# Patient Record
Sex: MALE | Race: ASIAN | NOT HISPANIC OR LATINO
[De-identification: names, ages, dates, MRNs, and addresses within clinical notes are randomized per-mention and may not be internally consistent; named-entity substitution may affect disease eponyms.]

---

## 2022-05-18 PROBLEM — Z00.00 ENCOUNTER FOR PREVENTIVE HEALTH EXAMINATION: Status: ACTIVE | Noted: 2022-05-18

## 2022-05-20 ENCOUNTER — APPOINTMENT (OUTPATIENT)
Dept: UROLOGY | Facility: CLINIC | Age: 37
End: 2022-05-20
Payer: MEDICAID

## 2022-05-20 VITALS
SYSTOLIC BLOOD PRESSURE: 132 MMHG | BODY MASS INDEX: 26.37 KG/M2 | DIASTOLIC BLOOD PRESSURE: 94 MMHG | WEIGHT: 168 LBS | TEMPERATURE: 97.6 F | HEART RATE: 73 BPM | HEIGHT: 67 IN

## 2022-05-20 DIAGNOSIS — Z78.9 OTHER SPECIFIED HEALTH STATUS: ICD-10-CM

## 2022-05-20 DIAGNOSIS — Z80.9 FAMILY HISTORY OF MALIGNANT NEOPLASM, UNSPECIFIED: ICD-10-CM

## 2022-05-20 DIAGNOSIS — Z86.79 PERSONAL HISTORY OF OTHER DISEASES OF THE CIRCULATORY SYSTEM: ICD-10-CM

## 2022-05-20 DIAGNOSIS — Z87.442 PERSONAL HISTORY OF URINARY CALCULI: ICD-10-CM

## 2022-05-20 DIAGNOSIS — N46.9 MALE INFERTILITY, UNSPECIFIED: ICD-10-CM

## 2022-05-20 DIAGNOSIS — Z86.39 PERSONAL HISTORY OF OTHER ENDOCRINE, NUTRITIONAL AND METABOLIC DISEASE: ICD-10-CM

## 2022-05-20 PROCEDURE — 99204 OFFICE O/P NEW MOD 45 MIN: CPT

## 2022-05-20 NOTE — ASSESSMENT
[FreeTextEntry1] : Mr. Castellon is a very pleasant 36 year old man here today for evaluation for infertility.\par TSH.\par FSH.\par LH.\par Testosterone.\par Prolactin.\par SHBG.\par Complete semen analysis.\par We discussed potential etiologies of subfertility\par RTO in 1 month scrotal US.

## 2022-05-20 NOTE — PHYSICAL EXAM
[General Appearance - Well Developed] : well developed [General Appearance - Well Nourished] : well nourished [Normal Appearance] : normal appearance [Well Groomed] : well groomed [General Appearance - In No Acute Distress] : no acute distress [Edema] : no peripheral edema [Respiration, Rhythm And Depth] : normal respiratory rhythm and effort [Exaggerated Use Of Accessory Muscles For Inspiration] : no accessory muscle use [Abdomen Soft] : soft [Abdomen Tenderness] : non-tender [Costovertebral Angle Tenderness] : no ~M costovertebral angle tenderness [Normal Station and Gait] : the gait and station were normal for the patient's age [] : no rash [No Focal Deficits] : no focal deficits [Oriented To Time, Place, And Person] : oriented to person, place, and time [Affect] : the affect was normal [Mood] : the mood was normal [Not Anxious] : not anxious [No Palpable Adenopathy] : no palpable adenopathy [Scrotum] : the scrotum was normal [Rectal Exam - Seminal Vesicles] : the seminal vesicles were normal [Epididymis] : the epididymides were normal [Testes Tenderness] : no tenderness of the testes [Testes Mass (___cm)] : there were no testicular masses

## 2022-05-20 NOTE — HISTORY OF PRESENT ILLNESS
[Currently Experiencing ___] :  [unfilled] [None] : None [FreeTextEntry1] : Mr. Castellon is a very pleasant 36 year old man here today for infertility.\par He reports that for the past year he and his wife have been actively trying to conceive with no success.\par His wife, Tracy, is 32.\par They deny using any form of contraception before.\par He denies any past pregnancies with any other women.\par She reports she has 2 children from other men, who are 7 and 11 years old.\par She reports that she is currently undergoing a work up with her GYN for fertility.\par He denies any urological symptoms.\par Denies any fatigue.\par Former smoker, quit 8 years ago.\par No history of trauma or surgeries to the scrotum.

## 2022-05-21 LAB
FSH SERPL-MCNC: 3.1 IU/L
LH SERPL-ACNC: 3.7 IU/L
PROLACTIN SERPL-MCNC: 4.7 NG/ML
TESTOST SERPL-MCNC: 237 NG/DL
TSH SERPL-ACNC: 2.03 UIU/ML

## 2022-05-26 LAB — SHBG SERPL-SCNC: 14.1 NMOL/L

## 2022-08-05 ENCOUNTER — APPOINTMENT (OUTPATIENT)
Dept: UROLOGY | Facility: CLINIC | Age: 37
End: 2022-08-05

## 2023-08-24 ENCOUNTER — APPOINTMENT (OUTPATIENT)
Dept: UROLOGY | Facility: CLINIC | Age: 38
End: 2023-08-24

## 2023-11-23 ENCOUNTER — EMERGENCY (EMERGENCY)
Facility: HOSPITAL | Age: 38
LOS: 1 days | Discharge: HOME | End: 2023-11-23
Admitting: STUDENT IN AN ORGANIZED HEALTH CARE EDUCATION/TRAINING PROGRAM
Payer: COMMERCIAL

## 2023-11-23 VITALS
SYSTOLIC BLOOD PRESSURE: 159 MMHG | DIASTOLIC BLOOD PRESSURE: 98 MMHG | OXYGEN SATURATION: 100 % | RESPIRATION RATE: 16 BRPM | TEMPERATURE: 98 F | HEART RATE: 82 BPM

## 2023-11-23 LAB
B PERT DNA SPEC QL NAA+PROBE: SIGNIFICANT CHANGE UP
B PERT DNA SPEC QL NAA+PROBE: SIGNIFICANT CHANGE UP
B PERT+PARAPERT DNA PNL SPEC NAA+PROBE: SIGNIFICANT CHANGE UP
B PERT+PARAPERT DNA PNL SPEC NAA+PROBE: SIGNIFICANT CHANGE UP
BORDETELLA PARAPERTUSSIS (RAPRVP): SIGNIFICANT CHANGE UP
BORDETELLA PARAPERTUSSIS (RAPRVP): SIGNIFICANT CHANGE UP
C PNEUM DNA SPEC QL NAA+PROBE: SIGNIFICANT CHANGE UP
C PNEUM DNA SPEC QL NAA+PROBE: SIGNIFICANT CHANGE UP
FLUAV SUBTYP SPEC NAA+PROBE: SIGNIFICANT CHANGE UP
FLUAV SUBTYP SPEC NAA+PROBE: SIGNIFICANT CHANGE UP
FLUBV RNA SPEC QL NAA+PROBE: SIGNIFICANT CHANGE UP
FLUBV RNA SPEC QL NAA+PROBE: SIGNIFICANT CHANGE UP
HADV DNA SPEC QL NAA+PROBE: SIGNIFICANT CHANGE UP
HADV DNA SPEC QL NAA+PROBE: SIGNIFICANT CHANGE UP
HCOV 229E RNA SPEC QL NAA+PROBE: SIGNIFICANT CHANGE UP
HCOV 229E RNA SPEC QL NAA+PROBE: SIGNIFICANT CHANGE UP
HCOV HKU1 RNA SPEC QL NAA+PROBE: SIGNIFICANT CHANGE UP
HCOV HKU1 RNA SPEC QL NAA+PROBE: SIGNIFICANT CHANGE UP
HCOV NL63 RNA SPEC QL NAA+PROBE: SIGNIFICANT CHANGE UP
HCOV NL63 RNA SPEC QL NAA+PROBE: SIGNIFICANT CHANGE UP
HCOV OC43 RNA SPEC QL NAA+PROBE: SIGNIFICANT CHANGE UP
HCOV OC43 RNA SPEC QL NAA+PROBE: SIGNIFICANT CHANGE UP
HMPV RNA SPEC QL NAA+PROBE: SIGNIFICANT CHANGE UP
HMPV RNA SPEC QL NAA+PROBE: SIGNIFICANT CHANGE UP
HPIV1 RNA SPEC QL NAA+PROBE: SIGNIFICANT CHANGE UP
HPIV1 RNA SPEC QL NAA+PROBE: SIGNIFICANT CHANGE UP
HPIV2 RNA SPEC QL NAA+PROBE: SIGNIFICANT CHANGE UP
HPIV2 RNA SPEC QL NAA+PROBE: SIGNIFICANT CHANGE UP
HPIV3 RNA SPEC QL NAA+PROBE: SIGNIFICANT CHANGE UP
HPIV3 RNA SPEC QL NAA+PROBE: SIGNIFICANT CHANGE UP
HPIV4 RNA SPEC QL NAA+PROBE: SIGNIFICANT CHANGE UP
HPIV4 RNA SPEC QL NAA+PROBE: SIGNIFICANT CHANGE UP
M PNEUMO DNA SPEC QL NAA+PROBE: SIGNIFICANT CHANGE UP
M PNEUMO DNA SPEC QL NAA+PROBE: SIGNIFICANT CHANGE UP
RAPID RVP RESULT: DETECTED
RAPID RVP RESULT: DETECTED
RSV RNA SPEC QL NAA+PROBE: SIGNIFICANT CHANGE UP
RSV RNA SPEC QL NAA+PROBE: SIGNIFICANT CHANGE UP
RV+EV RNA SPEC QL NAA+PROBE: DETECTED
RV+EV RNA SPEC QL NAA+PROBE: DETECTED
SARS-COV-2 RNA SPEC QL NAA+PROBE: SIGNIFICANT CHANGE UP
SARS-COV-2 RNA SPEC QL NAA+PROBE: SIGNIFICANT CHANGE UP

## 2023-11-23 PROCEDURE — 99284 EMERGENCY DEPT VISIT MOD MDM: CPT

## 2023-11-23 RX ORDER — LORATADINE 10 MG/1
10 TABLET ORAL ONCE
Refills: 0 | Status: COMPLETED | OUTPATIENT
Start: 2023-11-23 | End: 2023-11-23

## 2023-11-23 RX ORDER — PSEUDOEPHEDRINE HCL 30 MG
60 TABLET ORAL ONCE
Refills: 0 | Status: COMPLETED | OUTPATIENT
Start: 2023-11-23 | End: 2023-11-23

## 2023-11-23 RX ADMIN — Medication 60 MILLIGRAM(S): at 22:11

## 2023-11-23 RX ADMIN — LORATADINE 10 MILLIGRAM(S): 10 TABLET ORAL at 22:11

## 2023-11-23 NOTE — ED ADULT NURSE NOTE - OBJECTIVE STATEMENT
pt received in intake rm 4. pt is AAOX4 and ambulatory. pt c/o nasal congestion and mild cough for 1x week. pt used nasal spray with no relief. pt PMHx of HTN, HLD. pt denies chest pain, SOB, N/V/D, fever and chills. RR are even and unlabored. pt medicated as ordered. Safety maintained.

## 2023-11-23 NOTE — ED PROVIDER NOTE - PATIENT PORTAL LINK FT
You can access the FollowMyHealth Patient Portal offered by [facility] by registering at the following website: [email or web address]. By joining [employer]’s FollowMyHealth portal, you will also be able to view your health information using other applications (apps) compatible with our system.

## 2023-11-23 NOTE — ED PROVIDER NOTE - NSFOLLOWUPINSTRUCTIONS_ED_ALL_ED_FT
Postnasal Drip  Postnasal drip is the feeling of mucus going down the back of your throat. Mucus is a slimy substance that moistens and cleans your nose and throat, as well as the air pockets in face bones near your forehead and cheeks (sinuses). Small amounts of mucus pass from your nose and sinuses down the back of your throat all the time. This is normal. When you produce too much mucus or the mucus gets too thick, you can feel it.    Some common causes of postnasal drip include:  Having more mucus because of:  A cold or the flu.  Allergies.  Cold air.  Certain medicines.  Gastroesophageal reflux.  Having more mucus that is thicker because of:  A sinus or nasal infection.  Dry air.  A food allergy.  Follow these instructions at home:  Relieving discomfort    A person using nasal spray.  Gargle with a mixture of salt and water 3–4 times a day or as needed. To make salt water, completely dissolve ½–1 tsp (3–6 g) of salt in 1 cup (237 mL) of warm water.  If the air in your home is dry, use a humidifier to add moisture to the air.  Use a saline spray or a container (neti pot) to flush out the nose (nasal irrigation). These methods can help clear away mucus and keep the nasal passages moist.  General instructions    Take over-the-counter and prescription medicines only as told by your health care provider.  Follow instructions from your health care provider about eating or drinking restrictions. You may need to avoid caffeine.  Avoid things that you know you are allergic to (allergens), like dust, mold, pollen, pets, or certain foods.  Drink enough fluid to keep your urine pale yellow.  Keep all follow-up visits. This is important.  Contact a health care provider if:  You have a fever.  You have a sore throat or difficulty swallowing.  You have a headache.  You have sinus or ear pain.  You have a cough that does not go away.  The mucus from your nose becomes thick and is green or yellow in color.  You have cold or flu symptoms that last more than 10 days.  Summary  Postnasal drip is the feeling of mucus going down the back of your throat.  Use nasal irrigation or a nasal spray to help clear away mucus and keep the nasal passages moist.  Avoid things that you know you are allergic to (allergens), like dust, mold, pollen, pets, or certain foods.

## 2023-11-23 NOTE — ED PROVIDER NOTE - CLINICAL SUMMARY MEDICAL DECISION MAKING FREE TEXT BOX
38-year-old male with history of hypertension and hyperlipidemia presents for nasal congestion and mild cough for 1 week.  Patient notes using nasal spray with minimal relief.  Patient decided to come into ED tonight because his wife is here on the labor and delivery for to be checked out so he decided to get himself checked out.  Patient denies fever, chills, body aches, shortness of breath, chest pain, abdominal pain, vomiting, or diarrhea.  No other voiced complaints.  VSS. Exam as noted above. Symptoms likely 2/2 to viral illness- will obtain RVP,  provide meds, and d/c to f/u with PCP. Pt agreeable with plan.  Pt given strict return precautions.

## 2023-11-23 NOTE — ED PROVIDER NOTE - OBJECTIVE STATEMENT
38-year-old male with history of hypertension and hyperlipidemia presents for nasal congestion and mild cough for 1 week.  Patient notes using nasal spray with minimal relief.  Patient decided to come into ED tonight because his wife is here on the labor and delivery for to be checked out so he decided to get himself checked out.  Patient denies fever, chills, body aches, shortness of breath, chest pain, abdominal pain, vomiting, or diarrhea.  No other voiced complaints.

## 2023-11-23 NOTE — ED PROVIDER NOTE - PHYSICAL EXAMINATION
CONSTITUTIONAL: Well-appearing; well-nourished; in no apparent distress. Non-toxic appearing.   NEURO: Alert & oriented. Gait steady without assistance. Sensory and motor functions are grossly intact.  PSYCH: Mood appropriate. Thought processes intact.   ENT: EACs are without edema, erythema, or tenderness on speculum exam. TMs pearly b/l, no erythema or bulging. Nose midline, (+) b/l turbinate edema with clear rhinorrhea. Posterior pharynx is pink. No tonsil edema or exudates. Uvula midline.   NECK: Supple  CARD: Regular rate and rhythm, no murmurs  RESP: No accessory muscle use; breath sounds clear and equal bilaterally; no wheezes, rhonchi, or rales     ABD: Soft; non-distended; non-tender. No guarding or rebound.   MUSCULOSKELETAL/EXTREMITIES: FROM in all four extremities; no extremity edema.  SKIN: Warm; dry; no apparent lesions or exudate